# Patient Record
Sex: MALE | Race: BLACK OR AFRICAN AMERICAN | NOT HISPANIC OR LATINO | URBAN - METROPOLITAN AREA
[De-identification: names, ages, dates, MRNs, and addresses within clinical notes are randomized per-mention and may not be internally consistent; named-entity substitution may affect disease eponyms.]

---

## 2018-06-06 PROBLEM — 238131007 OVERWEIGHT: Status: ACTIVE | Noted: 2018-06-06

## 2018-06-06 PROBLEM — 449681000124101 LONG-TERM CURRENT USE OF ANTIPLATELET DRUG: Status: ACTIVE | Noted: 2018-06-06

## 2018-06-06 PROBLEM — 428283002 HISTORY OF POLYP OF COLON (SITUATION): Status: ACTIVE | Noted: 2018-06-06

## 2022-04-30 ENCOUNTER — TELEPHONE ENCOUNTER (OUTPATIENT)
Dept: URBAN - METROPOLITAN AREA CLINIC 121 | Facility: CLINIC | Age: 72
End: 2022-04-30

## 2022-04-30 RX ORDER — GLIMEPIRIDE 4 MG/1
TABLET ORAL
OUTPATIENT
Start: 2008-08-11 | End: 2013-12-27

## 2022-04-30 RX ORDER — INSULIN GLULISINE 100 [IU]/ML
5 UNITS AM 10 UNITS PM INJECTION, SOLUTION SUBCUTANEOUS
OUTPATIENT
Start: 2013-12-27

## 2022-04-30 RX ORDER — METFORMIN HYDROCHLORIDE 500 MG/1
TABLET, EXTENDED RELEASE ORAL
OUTPATIENT
Start: 2008-08-11

## 2022-04-30 RX ORDER — GLIMEPIRIDE 4 MG/1
TABLET ORAL
OUTPATIENT
Start: 2008-08-11

## 2022-04-30 RX ORDER — METFORMIN HYDROCHLORIDE 500 MG/1
TABLET, EXTENDED RELEASE ORAL
OUTPATIENT
Start: 2008-08-11 | End: 2013-12-27

## 2022-04-30 RX ORDER — METOPROLOL SUCCINATE 25 MG/1
TABLET, EXTENDED RELEASE ORAL
OUTPATIENT
Start: 2008-08-11

## 2022-04-30 RX ORDER — METOPROLOL SUCCINATE 25 MG/1
TABLET, EXTENDED RELEASE ORAL
OUTPATIENT
Start: 2008-08-11 | End: 2013-12-27

## 2022-04-30 RX ORDER — INSULIN GLULISINE 100 [IU]/ML
5 UNITS AM 10 UNITS PM INJECTION, SOLUTION SUBCUTANEOUS
OUTPATIENT
Start: 2013-12-27 | End: 2016-09-27

## 2022-05-01 ENCOUNTER — TELEPHONE ENCOUNTER (OUTPATIENT)
Dept: URBAN - METROPOLITAN AREA CLINIC 121 | Facility: CLINIC | Age: 72
End: 2022-05-01

## 2022-05-01 RX ORDER — CHROMIUM 200 MCG
TABLET ORAL
Status: ACTIVE | COMMUNITY
Start: 2018-06-06

## 2022-05-01 RX ORDER — SIMVASTATIN 20 MG/1
TABLET, FILM COATED ORAL
Status: ACTIVE | COMMUNITY
Start: 2008-08-11

## 2022-05-01 RX ORDER — METFORMIN HCL 1000 MG/1
BID TABLET ORAL
Status: ACTIVE | COMMUNITY
Start: 2013-12-27

## 2022-05-01 RX ORDER — ASPIRIN 325 MG/1
TABLET ORAL
Status: ACTIVE | COMMUNITY
Start: 2008-08-11

## 2022-05-01 RX ORDER — INSULIN ASPART 100 [IU]/ML
INJECTION, SOLUTION INTRAVENOUS; SUBCUTANEOUS
Status: ACTIVE | COMMUNITY
Start: 2018-06-06

## 2022-05-01 RX ORDER — LISINOPRIL 20 MG/1
QD TABLET ORAL
Status: ACTIVE | COMMUNITY
Start: 2013-12-27

## 2022-05-01 RX ORDER — INSULIN LISPRO 100 [IU]/ML
INJECTION, SOLUTION INTRAVENOUS; SUBCUTANEOUS
Status: ACTIVE | COMMUNITY
Start: 2016-09-27

## 2022-05-01 RX ORDER — METOPROLOL TARTRATE 100 MG/1
QD TABLET, FILM COATED ORAL
Status: ACTIVE | COMMUNITY
Start: 2013-12-27

## 2022-05-01 RX ORDER — INSULIN DETEMIR 100 [IU]/ML
45 UNITS BID INJECTION, SOLUTION SUBCUTANEOUS
Status: ACTIVE | COMMUNITY
Start: 2013-12-27

## 2023-07-20 ENCOUNTER — OFFICE VISIT (OUTPATIENT)
Dept: URBAN - METROPOLITAN AREA CLINIC 27 | Facility: CLINIC | Age: 73
End: 2023-07-20
Payer: MEDICARE

## 2023-07-20 ENCOUNTER — DASHBOARD ENCOUNTERS (OUTPATIENT)
Age: 73
End: 2023-07-20

## 2023-07-20 ENCOUNTER — WEB ENCOUNTER (OUTPATIENT)
Dept: URBAN - METROPOLITAN AREA CLINIC 27 | Facility: CLINIC | Age: 73
End: 2023-07-20

## 2023-07-20 VITALS
WEIGHT: 214 LBS | HEART RATE: 91 BPM | DIASTOLIC BLOOD PRESSURE: 84 MMHG | HEIGHT: 74 IN | SYSTOLIC BLOOD PRESSURE: 124 MMHG | BODY MASS INDEX: 27.46 KG/M2

## 2023-07-20 DIAGNOSIS — I10 ESSENTIAL (PRIMARY) HYPERTENSION: ICD-10-CM

## 2023-07-20 DIAGNOSIS — E11.9 TYPE 2 DIABETES MELLITUS WITHOUT COMPLICATIONS: ICD-10-CM

## 2023-07-20 DIAGNOSIS — Z86.010 PERSONAL HISTORY OF COLONIC POLYPS: ICD-10-CM

## 2023-07-20 PROCEDURE — 99243 OFF/OP CNSLTJ NEW/EST LOW 30: CPT | Performed by: INTERNAL MEDICINE

## 2023-07-20 RX ORDER — ASPIRIN 325 MG/1
TABLET ORAL
Status: ACTIVE | COMMUNITY
Start: 2008-08-11

## 2023-07-20 RX ORDER — METFORMIN HCL 1000 MG/1
BID TABLET ORAL
Status: ACTIVE | COMMUNITY
Start: 2013-12-27

## 2023-07-20 RX ORDER — CHROMIUM 200 MCG
TABLET ORAL
Status: ACTIVE | COMMUNITY
Start: 2018-06-06

## 2023-07-20 RX ORDER — SIMVASTATIN 20 MG/1
TABLET, FILM COATED ORAL
Status: ACTIVE | COMMUNITY
Start: 2008-08-11

## 2023-07-20 RX ORDER — LISINOPRIL 20 MG/1
QD TABLET ORAL
Status: ACTIVE | COMMUNITY
Start: 2013-12-27

## 2023-07-20 RX ORDER — METOPROLOL TARTRATE 100 MG/1
QD TABLET, FILM COATED ORAL
Status: ACTIVE | COMMUNITY
Start: 2013-12-27

## 2023-07-20 RX ORDER — INSULIN ASPART 100 [IU]/ML
INJECTION, SOLUTION INTRAVENOUS; SUBCUTANEOUS
Status: ACTIVE | COMMUNITY
Start: 2018-06-06

## 2023-07-20 RX ORDER — INSULIN LISPRO 100 [IU]/ML
INJECTION, SOLUTION INTRAVENOUS; SUBCUTANEOUS
Status: ACTIVE | COMMUNITY
Start: 2016-09-27

## 2023-07-20 RX ORDER — INSULIN DETEMIR 100 [IU]/ML
45 UNITS BID INJECTION, SOLUTION SUBCUTANEOUS
Status: DISCONTINUED | COMMUNITY
Start: 2013-12-27

## 2023-07-20 NOTE — HPI-TODAY'S VISIT:
This is a 72-year-old male seen in consultation today at the request of Dr. Regan for history of colon polyps.  Last colonoscopy 2018.  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  He is on aspirin.  His bowel movements are normal.  He takes insulin, lisinopril, metformin and metoprolol he has no complaints today

## 2023-07-21 ENCOUNTER — LAB OUTSIDE AN ENCOUNTER (OUTPATIENT)
Dept: URBAN - METROPOLITAN AREA CLINIC 27 | Facility: CLINIC | Age: 73
End: 2023-07-21

## 2023-09-21 ENCOUNTER — OUT OF OFFICE VISIT (OUTPATIENT)
Dept: URBAN - METROPOLITAN AREA SURGERY CENTER 7 | Facility: SURGERY CENTER | Age: 73
End: 2023-09-21
Payer: MEDICARE

## 2023-09-21 ENCOUNTER — CLAIMS CREATED FROM THE CLAIM WINDOW (OUTPATIENT)
Dept: URBAN - METROPOLITAN AREA CLINIC 4 | Facility: CLINIC | Age: 73
End: 2023-09-21
Payer: MEDICARE

## 2023-09-21 DIAGNOSIS — Z86.010 PERSONAL HISTORY OF COLON POLYPS: ICD-10-CM

## 2023-09-21 DIAGNOSIS — K63.89 APPENDICITIS EPIPLOICA: ICD-10-CM

## 2023-09-21 DIAGNOSIS — D12.0 BENIGN NEOPLASM OF CECUM: ICD-10-CM

## 2023-09-21 DIAGNOSIS — D12.0 ADENOMATOUS POLYP OF CECUM: ICD-10-CM

## 2023-09-21 DIAGNOSIS — D12.0 ADENOMA OF CECUM: ICD-10-CM

## 2023-09-21 DIAGNOSIS — D12.3 ADENOMATOUS POLYP OF TRANSVERSE COLON: ICD-10-CM

## 2023-09-21 DIAGNOSIS — Z86.010 ADENOMAS PERSONAL HISTORY OF COLONIC POLYPS: ICD-10-CM

## 2023-09-21 DIAGNOSIS — Z12.11 COLON CANCER SCREENING (HIGH RISK): ICD-10-CM

## 2023-09-21 PROCEDURE — G8907 PT DOC NO EVENTS ON DISCHARG: HCPCS | Performed by: INTERNAL MEDICINE

## 2023-09-21 PROCEDURE — 45385 COLONOSCOPY W/LESION REMOVAL: CPT | Performed by: INTERNAL MEDICINE

## 2023-09-21 PROCEDURE — 00811 ANES LWR INTST NDSC NOS: CPT | Performed by: NURSE ANESTHETIST, CERTIFIED REGISTERED

## 2023-09-21 PROCEDURE — 88305 TISSUE EXAM BY PATHOLOGIST: CPT | Performed by: PATHOLOGY

## 2023-09-21 RX ORDER — SIMVASTATIN 20 MG/1
TABLET, FILM COATED ORAL
Status: ACTIVE | COMMUNITY
Start: 2008-08-11

## 2023-09-21 RX ORDER — INSULIN ASPART 100 [IU]/ML
INJECTION, SOLUTION INTRAVENOUS; SUBCUTANEOUS
Status: ACTIVE | COMMUNITY
Start: 2018-06-06

## 2023-09-21 RX ORDER — INSULIN LISPRO 100 [IU]/ML
INJECTION, SOLUTION INTRAVENOUS; SUBCUTANEOUS
Status: ACTIVE | COMMUNITY
Start: 2016-09-27

## 2023-09-21 RX ORDER — ASPIRIN 325 MG/1
TABLET ORAL
Status: ACTIVE | COMMUNITY
Start: 2008-08-11

## 2023-09-21 RX ORDER — METFORMIN HCL 1000 MG/1
BID TABLET ORAL
Status: ACTIVE | COMMUNITY
Start: 2013-12-27

## 2023-09-21 RX ORDER — METOPROLOL TARTRATE 100 MG/1
QD TABLET, FILM COATED ORAL
Status: ACTIVE | COMMUNITY
Start: 2013-12-27

## 2023-09-21 RX ORDER — CHROMIUM 200 MCG
TABLET ORAL
Status: ACTIVE | COMMUNITY
Start: 2018-06-06

## 2023-09-21 RX ORDER — LISINOPRIL 20 MG/1
QD TABLET ORAL
Status: ACTIVE | COMMUNITY
Start: 2013-12-27